# Patient Record
Sex: MALE | Race: WHITE
[De-identification: names, ages, dates, MRNs, and addresses within clinical notes are randomized per-mention and may not be internally consistent; named-entity substitution may affect disease eponyms.]

---

## 2019-08-19 ENCOUNTER — HOSPITAL ENCOUNTER (EMERGENCY)
Dept: HOSPITAL 74 - JERFT | Age: 34
Discharge: HOME | End: 2019-08-19
Payer: COMMERCIAL

## 2019-08-19 VITALS — BODY MASS INDEX: 25 KG/M2

## 2019-08-19 VITALS — HEART RATE: 88 BPM | SYSTOLIC BLOOD PRESSURE: 137 MMHG | DIASTOLIC BLOOD PRESSURE: 75 MMHG | TEMPERATURE: 98.1 F

## 2019-08-19 DIAGNOSIS — M25.511: Primary | ICD-10-CM

## 2019-08-19 DIAGNOSIS — F17.210: ICD-10-CM

## 2019-08-19 DIAGNOSIS — K21.9: ICD-10-CM

## 2019-08-19 NOTE — PDOC
Rapid Medical Evaluation


Time Seen by Provider: 08/19/19 15:57


Medical Evaluation: 


 Allergies











Allergy/AdvReac Type Severity Reaction Status Date / Time


 


Penicillins Allergy   Verified 03/25/16 00:55











08/19/19 15:57





CC: Atraumatic right shoulder pain





PE: TTP over right anterior capsule. No deformity, dislocation, step-offs 

present.





Orders: nothing





Patient will proceed to ED for continued evaluation.





**Discharge Disposition





- Diagnosis


 Right anterior shoulder pain








- Referrals





- Patient Instructions





- Post Discharge Activity

## 2019-08-19 NOTE — PDOC
History of Present Illness





- General


Chief Complaint: Pain, Acute


Stated Complaint: RT SHOULDER PAIN


Time Seen by Provider: 08/19/19 15:57





- History of Present Illness


Initial Comments: 





08/19/19 16:32


34-year-old male presents for evaluation of atraumatic onset of right shoulder 

pain 2 days





Past History





- Past Medical History


Allergies/Adverse Reactions: 


 Allergies











Allergy/AdvReac Type Severity Reaction Status Date / Time


 


Penicillins Allergy   Verified 08/19/19 15:59











Home Medications: 


Ambulatory Orders





Omeprazole [Prilosec (RX)] 40 mg PO DAILY 10/06/15 


Diphenhydramine HCl [Benadryl -] 25 mg PO Q6H #28 capsule 03/25/16 


Loratadine [Claritin -] 10 mg PO DAILY #7 tablet 03/25/16 


Ibuprofen [Motrin -] 600 mg PO TID #30 tablet 08/19/19 








Anemia: No


Asthma: No


Cancer: No


Cardiac Disorders: No


CVA: No


COPD: No


CHF: No


Dementia: No


Diabetes: No


GI Disorders: Yes (GERD)


 Disorders: No


HTN: No


Hypercholesterolemia: No


Liver Disease: No


Seizures: No


Thyroid Disease: No





- Surgical History


Abdominal Surgery: No


Appendectomy: No


Cardiac Surgery: No


Cholecystectomy: No


Lung Surgery: No


Neurologic Surgery: No


Orthopedic Surgery: No





- Suicide/Smoking/Psychosocial Hx


Smoking History: Current some day smoker


Have you smoked in the past 12 months: No


Number of Cigarettes Smoked Daily: 0


Information on smoking cessation initiated: No


Hx Alcohol Use: Yes


Drug/Substance Use Hx: Yes


Substance Use Type: None


Hx Substance Use Treatment: No





**Review of Systems





- Review of Systems


Musculoskeletal: Yes: Joint Pain





*Physical Exam





- Vital Signs


 Last Vital Signs











Temp Pulse Resp BP Pulse Ox


 


 98.1 F   88   16   137/75   98 


 


 08/19/19 15:57  08/19/19 15:57  08/19/19 15:57  08/19/19 15:57  08/19/19 15:57














- Physical Exam


Comments: 





08/19/19 16:32


Skin color and temperature are normal range of motion is full. 5 out of 5 

strength with bilateral supraspinatus isolation external and internal rotation 

mildly positive impingement maneuvers on the right negative Spurling maneuver 

neurovascularly intact no gross sensory motor deficits.





Medical Decision Making





- Medical Decision Making





08/19/19 16:34


Medical right shoulder pain most likely impingement syndrome will have patient 

follow up with orthospine discussed use of anti-inflammatories





*DC/Admit/Observation/Transfer


Diagnosis at time of Disposition: 


 Right anterior shoulder pain








- Discharge Dispostion


Disposition: HOME


Condition at time of disposition: Stable


Decision to Admit order: No





- Referrals


Referrals: 


Juan Manuel Pederson DO [Staff Physician] - 





- Patient Instructions


Additional Instructions: 


Tylenol as directed as well as the anti-inflammatory you prescribed 40 return 

to the emergency room for worsening symptoms and follow-up with orthopedic 

surgery in 1-2 days for further evaluation and treatment options.





- Post Discharge Activity